# Patient Record
Sex: FEMALE | Race: WHITE | NOT HISPANIC OR LATINO | Employment: FULL TIME | URBAN - METROPOLITAN AREA
[De-identification: names, ages, dates, MRNs, and addresses within clinical notes are randomized per-mention and may not be internally consistent; named-entity substitution may affect disease eponyms.]

---

## 2017-01-26 ENCOUNTER — HISTORICAL (OUTPATIENT)
Dept: ADMINISTRATIVE | Facility: HOSPITAL | Age: 57
End: 2017-01-26

## 2017-01-26 LAB
ALBUMIN SERPL-MCNC: 4.3 G/DL (ref 3.1–4.7)
ALP SERPL-CCNC: 59 IU/L (ref 40–104)
ALT (SGPT): 38 IU/L (ref 3–33)
AST SERPL-CCNC: 38 IU/L (ref 10–40)
BASOPHILS NFR BLD: 0.1 K/UL (ref 0–0.2)
BASOPHILS NFR BLD: 0.9 %
BILIRUB SERPL-MCNC: 0.3 MG/DL (ref 0.3–1)
BUN SERPL-MCNC: 8 MG/DL (ref 8–20)
CALCIUM SERPL-MCNC: 9.2 MG/DL (ref 7.7–10.4)
CHLORIDE: 106 MMOL/L (ref 98–110)
CO2 SERPL-SCNC: 26.8 MMOL/L (ref 22.8–31.6)
CREATININE: 0.79 MG/DL (ref 0.6–1.4)
EOSINOPHIL NFR BLD: 0.2 K/UL (ref 0–0.7)
EOSINOPHIL NFR BLD: 2.3 %
ERYTHROCYTE [DISTWIDTH] IN BLOOD BY AUTOMATED COUNT: 12.1 % (ref 11.7–14.9)
GLUCOSE: 144 MG/DL (ref 70–99)
GRAN #: 3 K/UL (ref 1.4–6.5)
GRAN%: 46.1 %
HCT VFR BLD AUTO: 37.2 % (ref 36–48)
HGB BLD-MCNC: 12.2 G/DL (ref 12–15)
IMMATURE GRANS (ABS): 0 K/UL (ref 0–1)
IMMATURE GRANULOCYTES: 0.2 %
LYMPH #: 2.9 K/UL (ref 1.2–3.4)
LYMPH%: 45.1 %
MCH RBC QN AUTO: 31 PG (ref 25–35)
MCHC RBC AUTO-ENTMCNC: 32.8 G/DL (ref 31–36)
MCV RBC AUTO: 94.4 FL (ref 79–98)
MONO #: 0.4 K/UL (ref 0.1–0.6)
MONO%: 5.4 %
NUCLEATED RBCS: 0 %
PLATELET # BLD AUTO: 312 K/UL (ref 140–440)
PMV BLD AUTO: 9.7 FL (ref 8.8–12.7)
POTASSIUM SERPL-SCNC: 3.8 MMOL/L (ref 3.5–5)
PROT SERPL-MCNC: 7.2 G/DL (ref 6–8.2)
RBC # BLD AUTO: 3.94 M/UL (ref 3.5–5.5)
SODIUM: 140 MMOL/L (ref 134–144)
WBC # BLD AUTO: 6.5 K/UL (ref 5–10)

## 2017-06-05 RX ORDER — ALPRAZOLAM 1 MG/1
1 TABLET ORAL DAILY
COMMUNITY
End: 2019-06-25

## 2017-06-05 RX ORDER — MONTELUKAST SODIUM 10 MG/1
10 TABLET ORAL NIGHTLY
COMMUNITY

## 2017-06-05 RX ORDER — ALBUTEROL SULFATE 90 UG/1
2 AEROSOL, METERED RESPIRATORY (INHALATION) EVERY 6 HOURS PRN
COMMUNITY

## 2017-06-05 RX ORDER — TIZANIDINE HYDROCHLORIDE 4 MG/1
4 CAPSULE, GELATIN COATED ORAL 3 TIMES DAILY
COMMUNITY
End: 2017-06-05 | Stop reason: SDUPTHER

## 2017-06-05 RX ORDER — ESCITALOPRAM OXALATE 20 MG/1
20 TABLET ORAL DAILY
COMMUNITY
End: 2018-11-12

## 2017-06-05 RX ORDER — CLONAZEPAM 0.5 MG/1
0.5 TABLET ORAL 2 TIMES DAILY PRN
COMMUNITY
End: 2017-08-02 | Stop reason: SDUPTHER

## 2017-06-05 RX ORDER — QUETIAPINE FUMARATE 50 MG/1
50 TABLET, FILM COATED ORAL NIGHTLY
COMMUNITY
End: 2019-06-25

## 2017-06-05 RX ORDER — TOPIRAMATE 100 MG/1
100 TABLET, FILM COATED ORAL 2 TIMES DAILY
COMMUNITY
End: 2017-08-02

## 2017-06-05 RX ORDER — TRAMADOL HYDROCHLORIDE 50 MG/1
50 TABLET ORAL EVERY 6 HOURS PRN
COMMUNITY
End: 2017-08-02 | Stop reason: SDUPTHER

## 2017-06-05 RX ORDER — TIZANIDINE HYDROCHLORIDE 4 MG/1
4 CAPSULE, GELATIN COATED ORAL 3 TIMES DAILY
Qty: 60 CAPSULE | Refills: 5 | Status: SHIPPED | OUTPATIENT
Start: 2017-06-05 | End: 2017-08-02 | Stop reason: SDUPTHER

## 2017-08-02 ENCOUNTER — OFFICE VISIT (OUTPATIENT)
Dept: RHEUMATOLOGY | Facility: CLINIC | Age: 57
End: 2017-08-02
Payer: COMMERCIAL

## 2017-08-02 VITALS
BODY MASS INDEX: 33.13 KG/M2 | SYSTOLIC BLOOD PRESSURE: 160 MMHG | WEIGHT: 180 LBS | HEIGHT: 62 IN | DIASTOLIC BLOOD PRESSURE: 91 MMHG

## 2017-08-02 DIAGNOSIS — M15.9 OSTEOARTHRITIS, GENERALIZED: ICD-10-CM

## 2017-08-02 DIAGNOSIS — M79.7 FIBROMYALGIA: Primary | ICD-10-CM

## 2017-08-02 LAB
ALBUMIN SERPL-MCNC: 4.3 G/DL (ref 3.1–4.7)
ALP SERPL-CCNC: 69 IU/L (ref 40–104)
ALT (SGPT): 27 IU/L (ref 3–33)
AST SERPL-CCNC: 27 IU/L (ref 10–40)
BASOPHILS NFR BLD: 0.1 K/UL (ref 0–0.2)
BASOPHILS NFR BLD: 0.8 %
BILIRUB SERPL-MCNC: 0.6 MG/DL (ref 0.3–1)
BUN SERPL-MCNC: 12 MG/DL (ref 8–20)
CALCIUM SERPL-MCNC: 9.4 MG/DL (ref 7.7–10.4)
CHLORIDE: 102 MMOL/L (ref 98–110)
CO2 SERPL-SCNC: 26.1 MMOL/L (ref 22.8–31.6)
CREATININE: 0.67 MG/DL (ref 0.6–1.4)
CRP SERPL-MCNC: 0.4 MG/DL (ref 0–1.4)
EOSINOPHIL NFR BLD: 0.1 K/UL (ref 0–0.7)
EOSINOPHIL NFR BLD: 1.5 %
ERYTHROCYTE [DISTWIDTH] IN BLOOD BY AUTOMATED COUNT: 11.8 % (ref 11.7–14.9)
GLUCOSE: 98 MG/DL (ref 70–99)
GRAN #: 2.8 K/UL (ref 1.4–6.5)
GRAN%: 32.9 %
HCT VFR BLD AUTO: 37 % (ref 36–48)
HGB BLD-MCNC: 12.6 G/DL (ref 12–15)
IMMATURE GRANS (ABS): 0 K/UL (ref 0–1)
IMMATURE GRANULOCYTES: 0.1 %
LYMPH #: 5 K/UL (ref 1.2–3.4)
LYMPH%: 58.7 %
MCH RBC QN AUTO: 31 PG (ref 25–35)
MCHC RBC AUTO-ENTMCNC: 34.1 G/DL (ref 31–36)
MCV RBC AUTO: 90.9 FL (ref 79–98)
MONO #: 0.5 K/UL (ref 0.1–0.6)
MONO%: 6 %
NUCLEATED RBCS: 0 %
PLATELET # BLD AUTO: 393 K/UL (ref 140–440)
PMV BLD AUTO: 10.3 FL (ref 8.8–12.7)
POTASSIUM SERPL-SCNC: 3.5 MMOL/L (ref 3.5–5)
PROT SERPL-MCNC: 7.7 G/DL (ref 6–8.2)
RBC # BLD AUTO: 4.07 M/UL (ref 3.5–5.5)
SODIUM: 138 MMOL/L (ref 134–144)
TSH SERPL DL<=0.005 MIU/L-ACNC: 3.79 ULU/ML (ref 0.3–5.6)
URATE SERPL-MCNC: 5.6 MG/DL (ref 2.6–7.8)
VITAMIN D, 1,25 (OH)2: 46.9 NG/ML (ref 30–100)
WBC # BLD AUTO: 8.5 K/UL (ref 5–10)

## 2017-08-02 PROCEDURE — 99213 OFFICE O/P EST LOW 20 MIN: CPT | Mod: ,,, | Performed by: INTERNAL MEDICINE

## 2017-08-02 RX ORDER — TRAMADOL HYDROCHLORIDE 50 MG/1
50 TABLET ORAL EVERY 6 HOURS PRN
Qty: 60 TABLET | Refills: 3 | Status: SHIPPED | OUTPATIENT
Start: 2017-08-02 | End: 2017-11-28 | Stop reason: SDUPTHER

## 2017-08-02 RX ORDER — TIZANIDINE HYDROCHLORIDE 4 MG/1
4 CAPSULE, GELATIN COATED ORAL 3 TIMES DAILY
Qty: 60 CAPSULE | Refills: 5 | Status: SHIPPED | OUTPATIENT
Start: 2017-08-02 | End: 2017-11-28 | Stop reason: SDUPTHER

## 2017-08-02 RX ORDER — CLONAZEPAM 0.5 MG/1
0.5 TABLET ORAL NIGHTLY
Qty: 60 TABLET | Refills: 3 | Status: SHIPPED | OUTPATIENT
Start: 2017-08-02 | End: 2018-07-29 | Stop reason: SDUPTHER

## 2017-08-02 NOTE — PROGRESS NOTES
"       [facility] RHEUMATOLOGY            PROGRESS NOTE      Subjective:       Patient ID:   NAME: Isaura Troy : 1960     56 y.o. female    Referring Doc: No ref. provider found  Other Physicians:    Chief Complaint:  Fibromyalgia (follow up, increased pain since last visit) and Osteoarthritis (follow up)      History of Present Illness:     Patient returns today for a regularly scheduled follow-up visit for FM,OA      The patient is under the care of pain management for neck pain due to trauma.  Diffuse aches and pains and fatigue persist  Arthralgias but no swelling  AM stiffness for 10-15 mins          ROS:   GEN:  No  fever, night sweats . weight is stable   + fatigue  SKIN: no rashes, no bruising, no ulcerations, no Raynaud's  HEENT: +HA's, No visual changes, no mucosal ulcers, Dry mouth,  CV:   no CP, SOB, PND, BLOCK, no orthopnea, occ  palpitations  PULM: normal with no SOB, cough, hemoptysis, sputum or pleuritic pain  GI:  no abdominal pain,+ nausea,No vomiting, constipation, +diarrhea, No melanotic stools, BRBPR, hematemesis, no dysphagia  :   +dysuria  NEURO: L arm,L leg paresthesias,+ headaches, No visual disturbances, muscle weakness  MUSCULOSKELETAL:no joint swelling, prolonged AM stiffness, +back pain, + muscle pain  Allergies:  Review of patient's allergies indicates:   Allergen Reactions    Cefaclor Rash and Shortness Of Breath    Erythromycin Rash and Shortness Of Breath     "SLOWS MY BREATHING."    Sulfa (sulfonamide antibiotics) Other (See Comments) and Shortness Of Breath     "ASA EXACERBATES MY ASTHMA."    Aspirin        Medications:    Current Outpatient Prescriptions:     albuterol 90 mcg/actuation inhaler, Inhale 2 puffs into the lungs every 6 (six) hours as needed for Wheezing. Rescue, Disp: , Rfl:     alprazolam (XANAX) 1 MG tablet, Take 1 mg by mouth once daily. , Disp: , Rfl:     clonazePAM (KLONOPIN) 0.5 MG tablet, Take 0.5 mg by mouth 2 (two) times daily as needed for " "Anxiety., Disp: , Rfl:     escitalopram oxalate (LEXAPRO) 20 MG tablet, Take 20 mg by mouth once daily., Disp: , Rfl:     montelukast (SINGULAIR) 10 mg tablet, Take 10 mg by mouth every evening., Disp: , Rfl:     quetiapine (SEROQUEL) 50 MG tablet, Take 50 mg by mouth every evening., Disp: , Rfl:     tizanidine 4 mg Cap, Take 4 mg by mouth 3 (three) times daily. 1/4 - 1 tablet oral three times daily, Disp: 60 capsule, Rfl: 5    tramadol (ULTRAM) 50 mg tablet, Take 50 mg by mouth every 6 (six) hours as needed for Pain., Disp: , Rfl:     PMHx/PSHx Updates:        Objective:     Vitals:  Blood pressure (!) 160/91, height 5' 2" (1.575 m), weight 81.6 kg (180 lb).    Physical Examination:   GEN: no apparent distress, comfortable; AAOx3  SKIN: no rashes,no ulceration, no Raynaud's, no petechiae, no SQ nodules,  HEAD: normal  EYES: no pallor, no icterus,  NECK: no masses, thyroid normal, trachea midline, no LAD/LN's, supple  CV: RRR with no murmur; l S1 and S2 reg. ,no gallop no rubs,   CHEST: Normal respiratory effort; CTAB; normal breath sounds; no wheeze or crackles  ABDOM: nontender and nondistended; soft; no masses; no rebound/guarding  MUSC/Skeletal: ROM normal; no crepitus; joints without synovitis,  no deformities  No joint swelling or tenderness of PIP, MCP, wrist, elbow, shoulder, or knee joints.Widespread trigger points  EXTREM: no clubbing, cyanosis, no edemaNEURO: grossly intact; motor WNL; AAOx3; ,   PSYCH: normal mood, affect and behavior  LYMPH: normal cervical, supraclavicular          Labs:   Lab Results   Component Value Date    WBC 6.5 01/26/2017    HGB 12.2 01/26/2017    HCT 37.2 01/26/2017    MCV 94.4 01/26/2017     01/26/2017    CMP  @[facility](NA,K,CL,CO2,GLU,BUN,Creatinine,Calcium,PROT,Albumin,Bilitot,Alkphos,AST,ALT,CRP,ESR,RF,CCP,ALEXIS,SSA,CPK,uric acid) )@  I have reviewed all available lab results and radiology reports.    Radiology/Diagnostic Studies:        Assessment/Plan:   (6) 30 " y.o. female with diagnosis of FM,OA. More.symptomatic    Can increase Klonopin to 1 mg at bedtime. She does not take Xanax when she takes Klonopin. She takes Xanax  as needed only during the day.    Labs        Discussion:     I have explained all of the above in detail and the patient understands all of the current recommendation(s). I have answered all questions to the best of my ability and to their complete satisfaction.       The patient is to continue with the current management plan         RTC in  3-4 months      Electronically signed by Coral Hernández MD

## 2017-11-28 ENCOUNTER — OFFICE VISIT (OUTPATIENT)
Dept: RHEUMATOLOGY | Facility: CLINIC | Age: 57
End: 2017-11-28
Payer: COMMERCIAL

## 2017-11-28 VITALS
BODY MASS INDEX: 34.78 KG/M2 | DIASTOLIC BLOOD PRESSURE: 80 MMHG | HEIGHT: 62 IN | WEIGHT: 189 LBS | SYSTOLIC BLOOD PRESSURE: 148 MMHG

## 2017-11-28 DIAGNOSIS — M15.9 OSTEOARTHRITIS, GENERALIZED: ICD-10-CM

## 2017-11-28 DIAGNOSIS — M79.7 FIBROMYALGIA: Primary | ICD-10-CM

## 2017-11-28 PROCEDURE — 99213 OFFICE O/P EST LOW 20 MIN: CPT | Mod: ,,, | Performed by: INTERNAL MEDICINE

## 2017-11-28 RX ORDER — CLONAZEPAM 0.5 MG/1
0.5 TABLET ORAL 2 TIMES DAILY PRN
Qty: 60 TABLET | Refills: 3 | Status: SHIPPED | OUTPATIENT
Start: 2017-11-28 | End: 2018-03-28 | Stop reason: SDUPTHER

## 2017-11-28 RX ORDER — MECLIZINE HYDROCHLORIDE 25 MG/1
50 TABLET ORAL
COMMUNITY

## 2017-11-28 RX ORDER — TIZANIDINE HYDROCHLORIDE 4 MG/1
4 CAPSULE, GELATIN COATED ORAL 3 TIMES DAILY
Qty: 90 CAPSULE | Refills: 5 | Status: SHIPPED | OUTPATIENT
Start: 2017-11-28 | End: 2018-03-28 | Stop reason: DRUGHIGH

## 2017-11-28 RX ORDER — TRAMADOL HYDROCHLORIDE 50 MG/1
50 TABLET ORAL EVERY 6 HOURS PRN
Qty: 120 TABLET | Refills: 3 | Status: SHIPPED | OUTPATIENT
Start: 2017-11-28 | End: 2018-03-28 | Stop reason: SDUPTHER

## 2017-11-28 NOTE — PROGRESS NOTES
"       [facility] RHEUMATOLOGY            PROGRESS NOTE      Subjective:       Patient ID:   NAME: Isaura Troy : 1960     57 y.o. female    Referring Doc: No ref. provider found  Other Physicians:    Chief Complaint:  No chief complaint on file.  Fibromyalgia    History of Present Illness:     Patient returns today for a regularly scheduled follow-up visit for  Fibromyalgia     The patient has been doing since some increased musculoskeletal pain since weather changed. No fevers cough or shortness of breath.            ROS:   GEN:  No  fever, night sweats . weight is stable   + fatigue  SKIN: no rashes, no bruising, no ulcerations, no Raynaud's  HEENT: no HA's, No visual changes, no mucosal ulcers, no sicca symptoms,  CV:   no CP, SOB, PND, BLOCK, no orthopnea, no palpitations  PULM: normal with no SOB, cough, hemoptysis, sputum or pleuritic pain  GI:  no abdominal pain, nausea, vomiting, constipation, diarrhea, melanotic stools, BRBPR, hematemesis, no dysphagia  :   no dysuria  NEURO: no paresthesias, headaches, visual disturbances, muscle weakness  MUSCULOSKELETAL:no joint swelling, prolonged AM stiffness, no back pain, + muscle pain  Allergies:  Review of patient's allergies indicates:   Allergen Reactions    Cefaclor Rash and Shortness Of Breath    Erythromycin Rash and Shortness Of Breath     "SLOWS MY BREATHING."    Sulfa (sulfonamide antibiotics) Other (See Comments) and Shortness Of Breath     "ASA EXACERBATES MY ASTHMA."    Aspirin     Latex Other (See Comments)     Makes skin peel after prolonged exposure.        Medications:    Current Outpatient Prescriptions:     albuterol 90 mcg/actuation inhaler, Inhale 2 puffs into the lungs every 6 (six) hours as needed for Wheezing. Rescue, Disp: , Rfl:     alprazolam (XANAX) 1 MG tablet, Take 1 mg by mouth once daily. , Disp: , Rfl:     clonazePAM (KLONOPIN) 0.5 MG tablet, Take 1 tablet (0.5 mg total) by mouth every evening. (Patient taking " "differently: Take 0.5 mg by mouth every evening. Can take 1 or 2 tabs qhs prn), Disp: 60 tablet, Rfl: 3    escitalopram oxalate (LEXAPRO) 20 MG tablet, Take 20 mg by mouth once daily., Disp: , Rfl:     meclizine (ANTIVERT) 25 mg tablet, Take 50 mg by mouth., Disp: , Rfl:     montelukast (SINGULAIR) 10 mg tablet, Take 10 mg by mouth every evening., Disp: , Rfl:     quetiapine (SEROQUEL) 50 MG tablet, Take 50 mg by mouth every evening., Disp: , Rfl:     tiZANidine 4 mg Cap, Take 4 mg by mouth 3 (three) times daily. 1/4 - 1 tablet oral three times daily, Disp: 90 capsule, Rfl: 5    traMADol (ULTRAM) 50 mg tablet, Take 1 tablet (50 mg total) by mouth every 6 (six) hours as needed for Pain., Disp: 120 tablet, Rfl: 3    clonazePAM (KLONOPIN) 0.5 MG tablet, Take 1 tablet (0.5 mg total) by mouth 2 (two) times daily as needed for Anxiety., Disp: 60 tablet, Rfl: 3    PMHx/PSHx Updates:    Objective:     Vitals:  Blood pressure (!) 148/80, height 5' 2" (1.575 m), weight 85.7 kg (189 lb).    Physical Examination:   GEN: no apparent distress, comfortable; AAOx3  SKIN: + rash,erythematous  on r forehead,no ulceration, no Raynaud's, no petechiae, no SQ nodules,  HEAD: normal  EYES: no pallor, no icterus,  NECK: no masses, thyroid normal, trachea midline, no LAD/LN's, supple  CV: RRR with no murmur; l S1 and S2 reg. ,no gallop no rubs,   CHEST: Normal respiratory effort; CTAB; normal breath sounds; no wheeze or crackles  ABDOM: nontender and nondistended; soft; no masses; no rebound/guarding  MUSC/Skeletal: ROM normal; no crepitus; joints without synovitis,  no deformities  No joint swelling or tenderness of PIP, MCP, wrist, elbow, shoulder, or knee joints widespread trigger points  EXTREM: no clubbing, cyanosis, no edema,normal  pulses   NEURO: grossly intact; motor WNL; AAOx3; ,  PSYCH: normal mood, affect and behavior  LYMPH: normal cervical, supraclavicular          Labs:   Lab Results   Component Value Date    WBC 8.5 " 08/02/2017    HGB 12.6 08/02/2017    HCT 37.0 08/02/2017    MCV 90.9 08/02/2017     08/02/2017    CMP  @[facility](NA,K,CL,CO2,GLU,BUN,Creatinine,Calcium,PROT,Albumin,Bilitot,Alkphos,AST,ALT,CRP,ESR,RF,CCP,ALEXIS,SSA,CPK,uric acid) )@  I have reviewed all available lab results and radiology reports.    Radiology/Diagnostic Studies:        Assessment/Plan:   (1) 57 y.o. female with diagnosis of Fibromyalgia and osteoarthritis. Patient is stable.            Discussion:     I have explained all of the above in detail and the patient understands all of the current recommendation(s). I have answered all questions to the best of my ability and to their complete satisfaction.       The patient is to continue with the current management plan         RTC in   4 months or before when necessary      Electronically signed by Coral Hernández MD

## 2018-03-28 ENCOUNTER — OFFICE VISIT (OUTPATIENT)
Dept: RHEUMATOLOGY | Facility: CLINIC | Age: 58
End: 2018-03-28
Payer: COMMERCIAL

## 2018-03-28 VITALS
BODY MASS INDEX: 35.43 KG/M2 | SYSTOLIC BLOOD PRESSURE: 148 MMHG | DIASTOLIC BLOOD PRESSURE: 96 MMHG | WEIGHT: 193.69 LBS

## 2018-03-28 DIAGNOSIS — M89.9 BONE DISEASE: ICD-10-CM

## 2018-03-28 DIAGNOSIS — M79.7 FIBROMYALGIA: Primary | ICD-10-CM

## 2018-03-28 DIAGNOSIS — M15.9 OSTEOARTHRITIS, GENERALIZED: ICD-10-CM

## 2018-03-28 PROCEDURE — 99213 OFFICE O/P EST LOW 20 MIN: CPT | Mod: ,,, | Performed by: INTERNAL MEDICINE

## 2018-03-28 RX ORDER — TIZANIDINE 4 MG/1
TABLET ORAL
Qty: 90 TABLET | Refills: 5 | Status: SHIPPED | OUTPATIENT
Start: 2018-03-28 | End: 2018-07-05 | Stop reason: SDUPTHER

## 2018-03-28 RX ORDER — TRAMADOL HYDROCHLORIDE 50 MG/1
50 TABLET ORAL EVERY 6 HOURS PRN
Qty: 120 TABLET | Refills: 3 | Status: SHIPPED | OUTPATIENT
Start: 2018-03-28 | End: 2018-07-05 | Stop reason: SDUPTHER

## 2018-03-28 RX ORDER — DULOXETIN HYDROCHLORIDE 30 MG/1
CAPSULE, DELAYED RELEASE ORAL
Qty: 60 CAPSULE | Refills: 5 | Status: SHIPPED | OUTPATIENT
Start: 2018-03-28 | End: 2018-11-12 | Stop reason: SDUPTHER

## 2018-03-28 RX ORDER — CLONAZEPAM 0.5 MG/1
0.5 TABLET ORAL 2 TIMES DAILY PRN
Qty: 60 TABLET | Refills: 3 | Status: SHIPPED | OUTPATIENT
Start: 2018-03-28 | End: 2018-07-05 | Stop reason: SDUPTHER

## 2018-03-28 RX ORDER — TIZANIDINE 4 MG/1
TABLET ORAL
Refills: 5 | COMMUNITY
Start: 2018-02-27 | End: 2018-03-28 | Stop reason: SDUPTHER

## 2018-03-28 RX ORDER — CLONAZEPAM 0.5 MG/1
0.5 TABLET ORAL NIGHTLY
Qty: 60 TABLET | Refills: 3 | Status: SHIPPED | OUTPATIENT
Start: 2018-03-28 | End: 2018-04-26 | Stop reason: SDUPTHER

## 2018-03-28 RX ORDER — DULOXETIN HYDROCHLORIDE 30 MG/1
CAPSULE, DELAYED RELEASE ORAL
Refills: 5 | COMMUNITY
Start: 2018-02-08 | End: 2018-03-28 | Stop reason: SDUPTHER

## 2018-03-28 NOTE — PROGRESS NOTES
"       [facility] RHEUMATOLOGY            PROGRESS NOTE      Subjective:       Patient ID:   NAME: Isaura Troy : 1960     57 y.o. female    Referring Doc: No ref. provider found  Other Physicians:    Chief Complaint:  Fibromyalgia      History of Present Illness:     Patient returns today for a regularly scheduled follow-up visit for Fibromyalgia and osteoarthritis.      The patient is doing well. She has periods of more fatigue and pain  At this time she is doing well. No fevers cough shortness of breath. No joint swelling.            ROS:   GEN:  No  fever, night sweats . weight is stable   some fatigue  SKIN: no rashes, no bruising, no ulcerations, no Raynaud's  HEENT: no HA's, No visual changes, no mucosal ulcers, no sicca symptoms,  CV:   no CP, SOB, PND, BLOCK, no orthopnea, no palpitations  PULM: normal with no SOB, cough, hemoptysis, sputum or pleuritic pain  GI:  no abdominal pain, nausea, vomiting, constipation, diarrhea, melanotic stools, BRBPR, hematemesis, no dysphagia  :   no dysuria  NEURO: no paresthesias, headaches, visual disturbances, muscle weakness  MUSCULOSKELETAL:no joint swelling, prolonged AM stiffness, + back pain, + muscle pain  Allergies:  Review of patient's allergies indicates:   Allergen Reactions    Cefaclor Rash and Shortness Of Breath    Erythromycin Rash and Shortness Of Breath     "SLOWS MY BREATHING."    Sulfa (sulfonamide antibiotics) Other (See Comments) and Shortness Of Breath     "ASA EXACERBATES MY ASTHMA."    Aspirin     Latex Other (See Comments)     Makes skin peel after prolonged exposure.        Medications:    Current Outpatient Prescriptions:     albuterol 90 mcg/actuation inhaler, Inhale 2 puffs into the lungs every 6 (six) hours as needed for Wheezing. Rescue, Disp: , Rfl:     alprazolam (XANAX) 1 MG tablet, Take 1 mg by mouth once daily. , Disp: , Rfl:     clonazePAM (KLONOPIN) 0.5 MG tablet, Take 1 tablet (0.5 mg total) by mouth every evening. " (Patient taking differently: Take 0.5 mg by mouth every evening. Can take 1 or 2 tabs qhs prn), Disp: 60 tablet, Rfl: 3    clonazePAM (KLONOPIN) 0.5 MG tablet, Take 1 tablet (0.5 mg total) by mouth 2 (two) times daily as needed for Anxiety., Disp: 60 tablet, Rfl: 3    DULoxetine (CYMBALTA) 30 MG capsule, TK 1 C PO BID, Disp: , Rfl: 5    escitalopram oxalate (LEXAPRO) 20 MG tablet, Take 20 mg by mouth once daily., Disp: , Rfl:     meclizine (ANTIVERT) 25 mg tablet, Take 50 mg by mouth., Disp: , Rfl:     montelukast (SINGULAIR) 10 mg tablet, Take 10 mg by mouth every evening., Disp: , Rfl:     quetiapine (SEROQUEL) 50 MG tablet, Take 50 mg by mouth every evening., Disp: , Rfl:     tiZANidine (ZANAFLEX) 4 MG tablet, TK 1/4 TO 1 T PO TID, Disp: , Rfl: 5    traMADol (ULTRAM) 50 mg tablet, Take 1 tablet (50 mg total) by mouth every 6 (six) hours as needed for Pain., Disp: 120 tablet, Rfl: 3    PMHx/PSHx Updates:        Objective:     Vitals:  Blood pressure (!) 148/96, weight 87.9 kg (193 lb 11.2 oz).    Physical Examination:   GEN: no apparent distress, comfortable; AAOx3  SKIN: no rashes,no ulceration, no Raynaud's, no petechiae, no SQ nodules,  HEAD: normal  EYES: no pallor, no icterus,   NECK: no masses, thyroid normal, trachea midline, no LAD/LN's, supple  CV: RRR with no murmur; l S1 and S2 reg. ,no gallop no rubs,   CHEST: Normal respiratory effort; CTAB; normal breath sounds; no wheeze or crackles  MUSC/Skeletal: ROM normal; no crepitus; joints without synovitis,  no deformities  No joint swelling or tenderness of PIP, MCP, wrist, elbow, shoulder, or knee joints  EXTREM: no clubbing, cyanosis, no edema,normal  pulses   NEURO: grossly intact; motor WNL; AAOx3;   PSYCH: normal mood, affect and behavior  LYMPH: normal cervical, supraclavicular          Labs:   Lab Results   Component Value Date    WBC 8.5 08/02/2017    HGB 12.6 08/02/2017    HCT 37.0 08/02/2017    MCV 90.9 08/02/2017     08/02/2017     CMP  @[facility](NA,K,CL,CO2,GLU,BUN,Creatinine,Calcium,PROT,Albumin,Bilitot,Alkphos,AST,ALT,CRP,ESR,RF,CCP,ALEXIS,SSA,CPK,uric acid) )@  I have reviewed all available lab results and radiology reports.    Radiology/Diagnostic Studies:        Assessment/Plan:   (1) 57 y.o. female with diagnosis of Fibromyalgia and osteoarthritis. She is doing well    She will send copies of recent blood tests done by her primary care physician.        Discussion:     I have explained all of the above in detail and the patient understands all of the current recommendation(s). I have answered all questions to the best of my ability and to their complete satisfaction.       The patient is to continue with the current management plan         RTC in   4 months or before if needed      Electronically signed by Coral Hernández MD

## 2018-04-26 RX ORDER — CLONAZEPAM 0.5 MG/1
TABLET ORAL
Qty: 60 TABLET | Refills: 2 | Status: SHIPPED | OUTPATIENT
Start: 2018-04-26 | End: 2018-05-26

## 2018-07-05 ENCOUNTER — OFFICE VISIT (OUTPATIENT)
Dept: RHEUMATOLOGY | Facility: CLINIC | Age: 58
End: 2018-07-05
Payer: COMMERCIAL

## 2018-07-05 VITALS
WEIGHT: 186.19 LBS | BODY MASS INDEX: 34.06 KG/M2 | DIASTOLIC BLOOD PRESSURE: 84 MMHG | SYSTOLIC BLOOD PRESSURE: 130 MMHG

## 2018-07-05 DIAGNOSIS — M79.7 FIBROMYALGIA: Primary | ICD-10-CM

## 2018-07-05 DIAGNOSIS — M15.9 OSTEOARTHRITIS, GENERALIZED: ICD-10-CM

## 2018-07-05 PROCEDURE — 99213 OFFICE O/P EST LOW 20 MIN: CPT | Mod: ,,, | Performed by: INTERNAL MEDICINE

## 2018-07-05 PROCEDURE — 3008F BODY MASS INDEX DOCD: CPT | Mod: ,,, | Performed by: INTERNAL MEDICINE

## 2018-07-05 RX ORDER — CLONAZEPAM 0.5 MG/1
0.5 TABLET ORAL 2 TIMES DAILY PRN
Qty: 60 TABLET | Refills: 3 | Status: SHIPPED | OUTPATIENT
Start: 2018-07-05 | End: 2018-11-12 | Stop reason: SDUPTHER

## 2018-07-05 RX ORDER — TIZANIDINE 4 MG/1
TABLET ORAL
Qty: 90 TABLET | Refills: 5 | Status: SHIPPED | OUTPATIENT
Start: 2018-07-05 | End: 2018-11-12 | Stop reason: SDUPTHER

## 2018-07-05 RX ORDER — TRAMADOL HYDROCHLORIDE 50 MG/1
50 TABLET ORAL EVERY 6 HOURS PRN
Qty: 120 TABLET | Refills: 3 | Status: SHIPPED | OUTPATIENT
Start: 2018-07-05 | End: 2018-08-30 | Stop reason: SDUPTHER

## 2018-07-05 NOTE — PROGRESS NOTES
"       [facility] RHEUMATOLOGY            PROGRESS NOTE      Subjective:       Patient ID:   NAME: Isaura Troy : 1960     57 y.o. female    Referring Doc: No ref. provider found  Other Physicians:    Chief Complaint:  Fibromyalgia      History of Present Illness:     Patient returns today for a regularly scheduled follow-up visit for  FM and Osteoarthritis     The patient is doing okay. No major flareups since her last visit. Some episodes of arthralgias and myalgias and fatigue. No chest pains, no fevers. No shortness of breath.            ROS:   GEN:  No  fever, night sweats . weight is stable   No fatigue  SKIN: no rashes, no bruising, no ulcerations, no Raynaud's  HEENT: no HA's, No visual changes, no mucosal ulcers, no sicca symptoms,  CV:   no CP, SOB, PND, BLOCK, no orthopnea, no palpitations  PULM: normal with no SOB, cough, hemoptysis, sputum or pleuritic pain  GI:  no abdominal pain, nausea, vomiting, constipation, diarrhea, melanotic stools, BRBPR, hematemesis, no dysphagia  :   no dysuria  NEURO: no paresthesias, headaches, visual disturbances, muscle weakness  MUSCULOSKELETAL:no joint swelling, prolonged AM stiffness, + back pain, + muscle pain  Allergies:  Review of patient's allergies indicates:   Allergen Reactions    Cefaclor Rash and Shortness Of Breath    Erythromycin Rash and Shortness Of Breath     "SLOWS MY BREATHING."    Sulfa (sulfonamide antibiotics) Other (See Comments) and Shortness Of Breath     "ASA EXACERBATES MY ASTHMA."    Aspirin     Latex Other (See Comments)     Makes skin peel after prolonged exposure.        Medications:    Current Outpatient Prescriptions:     alprazolam (XANAX) 1 MG tablet, Take 1 mg by mouth once daily. , Disp: , Rfl:     clonazePAM (KLONOPIN) 0.5 MG tablet, Take 1 tablet (0.5 mg total) by mouth 2 (two) times daily as needed for Anxiety., Disp: 60 tablet, Rfl: 3    diclofenac sodium 2 % SoPk, Apply 40 mg topically 2 (two) times daily., Disp: " 1 packet, Rfl: 2    DULoxetine (CYMBALTA) 30 MG capsule, TK 1 C PO BID, Disp: 60 capsule, Rfl: 5    escitalopram oxalate (LEXAPRO) 20 MG tablet, Take 20 mg by mouth once daily., Disp: , Rfl:     meclizine (ANTIVERT) 25 mg tablet, Take 50 mg by mouth., Disp: , Rfl:     montelukast (SINGULAIR) 10 mg tablet, Take 10 mg by mouth every evening., Disp: , Rfl:     quetiapine (SEROQUEL) 50 MG tablet, Take 50 mg by mouth every evening., Disp: , Rfl:     tiZANidine (ZANAFLEX) 4 MG tablet, TK 1/4 TO 1 T PO TID, Disp: 90 tablet, Rfl: 5    traMADol (ULTRAM) 50 mg tablet, Take 1 tablet (50 mg total) by mouth every 6 (six) hours as needed for Pain., Disp: 120 tablet, Rfl: 3    albuterol 90 mcg/actuation inhaler, Inhale 2 puffs into the lungs every 6 (six) hours as needed for Wheezing. Rescue, Disp: , Rfl:     PMHx/PSHx Updates:          Objective:     Vitals:  Blood pressure 130/84, weight 84.5 kg (186 lb 3.2 oz).    Physical Examination:   GEN: no apparent distress, comfortable; AAOx3  SKIN: no rashes,no ulceration, no Raynaud's, no petechiae, no SQ nodules,  HEAD: normal  EYES: no pallor, no icterus,   NECK: no masses, thyroid normal, trachea midline, no LAD/LN's, supple  CV: RRR with no murmur; l S1 and S2 reg. ,no gallop no rubs,   CHEST: Normal respiratory effort; CTAB; normal breath sounds; no wheeze or crackles  MUSC/Skeletal: ROM normal; no crepitus; joints without synovitis,  no deformities  No joint swelling or tenderness of PIP, MCP, wrist, elbow, shoulder, or knee joints  EXTREM: no clubbing, cyanosis, no edema,normal  pulses   NEURO: grossly intact; motor WNL; AAOx3; ,  PSYCH: normal mood, affect and behavior  LYMPH: normal cervical, supraclavicular          Labs:   Lab Results   Component Value Date    WBC 8.5 08/02/2017    HGB 12.6 08/02/2017    HCT 37.0 08/02/2017    MCV 90.9 08/02/2017     08/02/2017     CMP  @[facility](NA,K,CL,CO2,GLU,BUN,Creatinine,Calcium,PROT,Albumin,Bilitot,Alkphos,AST,ALT,CRP,ESR,RF,CCP,ALEXIS,SSA,CPK,uric acid) )@  I have reviewed all available lab results and radiology reports.    Radiology/Diagnostic Studies:        Assessment/Plan:   (1) 57 y.o. female with diagnosis of Fibromyalgia and Osteoarthritis.She is stable        CBC CMP    Discussion:     I have explained all of the above in detail and the patient understands all of the current recommendation(s). I have answered all questions to the best of my ability and to their complete satisfaction.       The patient is to continue with the current management plan         RTC in   1 month or before if needed      Electronically signed by Coral Hernández MD

## 2018-07-29 RX ORDER — CLONAZEPAM 0.5 MG/1
TABLET ORAL
Qty: 60 TABLET | Refills: 0 | Status: SHIPPED | OUTPATIENT
Start: 2018-07-29 | End: 2018-11-12

## 2018-08-28 RX ORDER — TIZANIDINE 4 MG/1
TABLET ORAL
Qty: 60 TABLET | Refills: 0 | Status: SHIPPED | OUTPATIENT
Start: 2018-08-28 | End: 2018-11-12

## 2018-08-30 RX ORDER — TIZANIDINE HYDROCHLORIDE 4 MG/1
CAPSULE, GELATIN COATED ORAL
Qty: 90 CAPSULE | Refills: 0 | Status: SHIPPED | OUTPATIENT
Start: 2018-08-30 | End: 2018-11-12

## 2018-08-30 RX ORDER — TRAMADOL HYDROCHLORIDE 50 MG/1
TABLET ORAL
Qty: 120 TABLET | Refills: 0 | Status: SHIPPED | OUTPATIENT
Start: 2018-08-30 | End: 2018-11-12 | Stop reason: SDUPTHER

## 2018-11-12 ENCOUNTER — OFFICE VISIT (OUTPATIENT)
Dept: RHEUMATOLOGY | Facility: CLINIC | Age: 58
End: 2018-11-12
Payer: COMMERCIAL

## 2018-11-12 VITALS
BODY MASS INDEX: 35.03 KG/M2 | HEART RATE: 90 BPM | SYSTOLIC BLOOD PRESSURE: 133 MMHG | WEIGHT: 191.5 LBS | DIASTOLIC BLOOD PRESSURE: 74 MMHG

## 2018-11-12 DIAGNOSIS — M79.7 FIBROMYALGIA: Primary | ICD-10-CM

## 2018-11-12 DIAGNOSIS — M15.9 OSTEOARTHRITIS, GENERALIZED: ICD-10-CM

## 2018-11-12 DIAGNOSIS — M85.88 OSTEOPENIA OF LUMBAR SPINE: ICD-10-CM

## 2018-11-12 PROCEDURE — 3008F BODY MASS INDEX DOCD: CPT | Mod: ,,, | Performed by: INTERNAL MEDICINE

## 2018-11-12 PROCEDURE — 99213 OFFICE O/P EST LOW 20 MIN: CPT | Mod: ,,, | Performed by: INTERNAL MEDICINE

## 2018-11-12 RX ORDER — CLONAZEPAM 0.5 MG/1
0.5 TABLET ORAL 2 TIMES DAILY PRN
Qty: 60 TABLET | Refills: 3 | Status: SHIPPED | OUTPATIENT
Start: 2018-11-12 | End: 2019-04-11 | Stop reason: SDUPTHER

## 2018-11-12 RX ORDER — TIZANIDINE 4 MG/1
TABLET ORAL
Qty: 90 TABLET | Refills: 5 | Status: SHIPPED | OUTPATIENT
Start: 2018-11-12 | End: 2019-06-25

## 2018-11-12 RX ORDER — TRAMADOL HYDROCHLORIDE 50 MG/1
50 TABLET ORAL EVERY 6 HOURS PRN
Qty: 120 TABLET | Refills: 2 | Status: SHIPPED | OUTPATIENT
Start: 2018-11-12 | End: 2019-04-11 | Stop reason: SDUPTHER

## 2018-11-12 RX ORDER — DULOXETIN HYDROCHLORIDE 30 MG/1
CAPSULE, DELAYED RELEASE ORAL
Qty: 60 CAPSULE | Refills: 5 | Status: SHIPPED | OUTPATIENT
Start: 2018-11-12 | End: 2019-06-25

## 2018-11-12 NOTE — PROGRESS NOTES
"       [facility] RHEUMATOLOGY            PROGRESS NOTE      Subjective:       Patient ID:   NAME: Isaura Troy : 1960     58 y.o. female    Referring Doc: No ref. provider found  Other Physicians:    Chief Complaint:  Fibromyalgia (Left ankle sprain possible broken toe)      History of Present Illness:     Patient returns today for a regularly scheduled follow-up visit for  OA and Fibromyalgia     The patient about a week or 2 ago and sprained her left ankle. She has it wrapped with eyes with improvement. No chest pains cough shortness of breath. Overall fibromyalgia has done well. + occasional bouts of fatigue.          ROS:   GEN:  No  fever, night sweats . weight is stable  + mild  fatigue  SKIN: no rashes, no bruising, no ulcerations, no Raynaud's  HEENT: no HA's, No visual changes, no mucosal ulcers, no sicca symptoms,  CV:   no CP, SOB, PND, BLOCK, no orthopnea, no palpitations  PULM: normal with no SOB, cough, hemoptysis, sputum or pleuritic pain  GI:  no abdominal pain, nausea, vomiting, constipation, diarrhea, melanotic stools, BRBPR, hematemesis, no dysphagia  :   no dysuria  NEURO: no paresthesias, headaches, visual disturbances, muscle weakness  MUSCULOSKELETAL:no joint swelling, prolonged AM stiffness, no back pain, + muscle pain  Allergies:  Review of patient's allergies indicates:   Allergen Reactions    Cefaclor Rash and Shortness Of Breath    Erythromycin Rash and Shortness Of Breath     "SLOWS MY BREATHING."    Sulfa (sulfonamide antibiotics) Other (See Comments) and Shortness Of Breath     "ASA EXACERBATES MY ASTHMA."    Aspirin     Latex Other (See Comments)     Makes skin peel after prolonged exposure.        Medications:    Current Outpatient Medications:     albuterol 90 mcg/actuation inhaler, Inhale 2 puffs into the lungs every 6 (six) hours as needed for Wheezing. Rescue, Disp: , Rfl:     alprazolam (XANAX) 1 MG tablet, Take 1 mg by mouth once daily. , Disp: , Rfl:     " clonazePAM (KLONOPIN) 0.5 MG tablet, Take 1 tablet (0.5 mg total) by mouth 2 (two) times daily as needed for Anxiety., Disp: 60 tablet, Rfl: 3    diclofenac sodium 2 % SoPk, Apply 40 mg topically 2 (two) times daily., Disp: 1 packet, Rfl: 2    DULoxetine (CYMBALTA) 30 MG capsule, TK 1 C PO BID, Disp: 60 capsule, Rfl: 5    meclizine (ANTIVERT) 25 mg tablet, Take 50 mg by mouth., Disp: , Rfl:     montelukast (SINGULAIR) 10 mg tablet, Take 10 mg by mouth every evening., Disp: , Rfl:     quetiapine (SEROQUEL) 50 MG tablet, Take 50 mg by mouth every evening., Disp: , Rfl:     tiZANidine (ZANAFLEX) 4 MG tablet, TK 1/4 TO 1 T PO TID, Disp: 90 tablet, Rfl: 5    traMADol (ULTRAM) 50 mg tablet, Take 1 tablet (50 mg total) by mouth every 6 (six) hours as needed., Disp: 120 tablet, Rfl: 2    PMHx/PSHx Updates:        Objective:     Vitals:  Blood pressure 133/74, pulse 90, weight 86.9 kg (191 lb 8 oz).    Physical Examination:   GEN: no apparent distress, comfortable; AAOx3  SKIN: no rashes,no ulceration, no Raynaud's, no petechiae, no SQ nodules,  HEAD: normal  EYES: no pallor, no icterus,  NECK: no masses, thyroid normal, trachea midline, no LAD/LN's, supple  CV: RRR with no murmur; l S1 and S2 reg. ,no gallop no rubs,   CHEST: Normal respiratory effort; CTAB; normal breath sounds; no wheeze or crackles  MUSC/Skeletal: ROM normal; no crepitus; joints without synovitis,  no deformities  No joint swelling or tenderness of PIP, MCP, wrist, elbow, shoulder, or knee joints  EXTREM: no clubbing, cyanosis, no edema,normal  pulses   NEURO: grossly intact; motor WNL; AAOx3; ,  PSYCH: normal mood, affect and behavior  LYMPH: normal cervical, supraclavicular          Labs:   Lab Results   Component Value Date    WBC 8.5 08/02/2017    HGB 12.6 08/02/2017    HCT 37.0 08/02/2017    MCV 90.9 08/02/2017     08/02/2017     CMP  @[facility](NA,K,CL,CO2,GLU,BUN,Creatinine,Calcium,PROT,Albumin,Bilitot,Alkphos,AST,ALT,CRP,ESR,RF,CCP,ALEXIS,SSA,CPK,uric acid) )@  I have reviewed all available lab results and radiology reports.    Radiology/Diagnostic Studies:        Assessment/Plan:   (1) 58 y.o. female with diagnosis of fibromyalgia and osteoarthritis. Patient is doing well.  2) L ankle sprain.Declines X Ray      CBC,CMP        Discussion:     I have explained all of the above in detail and the patient understands all of the current recommendation(s). I have answered all questions to the best of my ability and to their complete satisfaction.       The patient is to continue with the current management plan         RTC in 4 months or before if needed        Electronically signed by Coral Hernández MD

## 2019-04-15 RX ORDER — CLONAZEPAM 0.5 MG/1
TABLET ORAL
Qty: 60 TABLET | Refills: 0 | Status: SHIPPED | OUTPATIENT
Start: 2019-04-15 | End: 2019-05-22 | Stop reason: SDUPTHER

## 2019-04-15 RX ORDER — TRAMADOL HYDROCHLORIDE 50 MG/1
TABLET ORAL
Qty: 120 TABLET | Refills: 0 | Status: SHIPPED | OUTPATIENT
Start: 2019-04-15 | End: 2019-05-30 | Stop reason: SDUPTHER

## 2019-05-29 RX ORDER — CLONAZEPAM 0.5 MG/1
TABLET ORAL
Qty: 60 TABLET | Refills: 0 | Status: SHIPPED | OUTPATIENT
Start: 2019-05-29 | End: 2019-06-25

## 2019-05-30 RX ORDER — TRAMADOL HYDROCHLORIDE 50 MG/1
TABLET ORAL
Qty: 120 TABLET | Refills: 0 | Status: SHIPPED | OUTPATIENT
Start: 2019-05-30 | End: 2019-06-25

## 2019-06-25 ENCOUNTER — OFFICE VISIT (OUTPATIENT)
Dept: RHEUMATOLOGY | Facility: CLINIC | Age: 59
End: 2019-06-25
Payer: COMMERCIAL

## 2019-06-25 VITALS — DIASTOLIC BLOOD PRESSURE: 90 MMHG | BODY MASS INDEX: 31.92 KG/M2 | SYSTOLIC BLOOD PRESSURE: 150 MMHG | WEIGHT: 174.5 LBS

## 2019-06-25 DIAGNOSIS — M79.7 FIBROMYALGIA: Primary | ICD-10-CM

## 2019-06-25 DIAGNOSIS — M15.9 OSTEOARTHRITIS, GENERALIZED: ICD-10-CM

## 2019-06-25 PROCEDURE — 3008F PR BODY MASS INDEX (BMI) DOCUMENTED: ICD-10-PCS | Mod: ,,, | Performed by: INTERNAL MEDICINE

## 2019-06-25 PROCEDURE — 99213 PR OFFICE/OUTPT VISIT, EST, LEVL III, 20-29 MIN: ICD-10-PCS | Mod: ,,, | Performed by: INTERNAL MEDICINE

## 2019-06-25 PROCEDURE — 99213 OFFICE O/P EST LOW 20 MIN: CPT | Mod: ,,, | Performed by: INTERNAL MEDICINE

## 2019-06-25 PROCEDURE — 3008F BODY MASS INDEX DOCD: CPT | Mod: ,,, | Performed by: INTERNAL MEDICINE

## 2019-06-25 RX ORDER — PROMETHAZINE HYDROCHLORIDE 25 MG/1
25 TABLET ORAL
COMMUNITY

## 2019-06-25 RX ORDER — TRAMADOL HYDROCHLORIDE 50 MG/1
TABLET ORAL
Qty: 180 TABLET | Refills: 3 | Status: SHIPPED | OUTPATIENT
Start: 2019-06-25

## 2019-06-25 RX ORDER — DIVALPROEX SODIUM 250 MG/1
250 TABLET, DELAYED RELEASE ORAL 3 TIMES DAILY
COMMUNITY
End: 2019-12-23

## 2019-06-25 RX ORDER — DULOXETIN HYDROCHLORIDE 30 MG/1
CAPSULE, DELAYED RELEASE ORAL
Qty: 60 CAPSULE | Refills: 5 | Status: SHIPPED | OUTPATIENT
Start: 2019-06-25

## 2019-06-25 RX ORDER — TIZANIDINE 4 MG/1
TABLET ORAL
Qty: 90 TABLET | Refills: 3 | Status: SHIPPED | OUTPATIENT
Start: 2019-06-25 | End: 2019-12-23 | Stop reason: SDUPTHER

## 2019-06-25 RX ORDER — AMITRIPTYLINE HYDROCHLORIDE 10 MG/1
10 TABLET, FILM COATED ORAL NIGHTLY PRN
COMMUNITY
End: 2019-12-23 | Stop reason: DRUGHIGH

## 2019-06-25 RX ORDER — CLONAZEPAM 0.5 MG/1
TABLET ORAL
Qty: 60 TABLET | Refills: 3 | Status: SHIPPED | OUTPATIENT
Start: 2019-06-25 | End: 2019-11-02 | Stop reason: SDUPTHER

## 2019-06-25 NOTE — PROGRESS NOTES
"       [facility] RHEUMATOLOGY            PROGRESS NOTE      Subjective:       Patient ID:   NAME: Isaura Troy : 1960     58 y.o. female    Referring Doc: No ref. provider found  Other Physicians:    Chief Complaint:  Fibromyalgia      History of Present Illness:     Patient returns today for a regularly scheduled follow-up visit for FM and OA    The patient is doing well except for chronic headaches. She is seeing her neurologist and primary care physician for blood pressure control and headaches. Some fatigue. No chest pains, cough or shortness of breath. No joint swelling. Occasional muscle aches controlled with medications            ROS:   GEN:  No  fever, night sweats . weight is stable   +_ fatigue  SKIN: no rashes, no bruising, no ulcerations, no Raynaud's  HEENT: no HA's, No visual changes, no mucosal ulcers, no sicca symptoms,  CV:   no CP, SOB, PND, BLOCK, no orthopnea, no palpitations  PULM: normal with no SOB, cough, hemoptysis, sputum or pleuritic pain  GI:  no abdominal pain, nausea, vomiting, constipation, diarrhea, melanotic stools, BRBPR, hematemesis, no dysphagia  :   no dysuria  NEURO: no paresthesias, headaches, visual disturbances, muscle weakness  MUSCULOSKELETAL:no joint swelling, prolonged AM stiffness, no back pain, + muscle pain  Allergies:  Review of patient's allergies indicates:   Allergen Reactions    Cefaclor Rash and Shortness Of Breath    Erythromycin Rash and Shortness Of Breath     "SLOWS MY BREATHING."    Sulfa (sulfonamide antibiotics) Other (See Comments) and Shortness Of Breath     "ASA EXACERBATES MY ASTHMA."    Aspirin     Latex Other (See Comments)     Makes skin peel after prolonged exposure.        Medications:    Current Outpatient Medications:     albuterol 90 mcg/actuation inhaler, Inhale 2 puffs into the lungs every 6 (six) hours as needed for Wheezing. Rescue, Disp: , Rfl:     amitriptyline (ELAVIL) 10 MG tablet, Take 10 mg by mouth nightly as needed " for Insomnia. , Disp: , Rfl:     clonazePAM (KLONOPIN) 0.5 MG tablet, 2 po hs, Disp: 60 tablet, Rfl: 3    diclofenac sodium 2 % SoPk, Apply 40 mg topically 2 (two) times daily., Disp: 1 packet, Rfl: 2    divalproex (DEPAKOTE) 250 MG EC tablet, Take 250 mg by mouth 3 (three) times daily., Disp: , Rfl:     DULoxetine (CYMBALTA) 30 MG capsule, TK 1 C PO BID, Disp: 60 capsule, Rfl: 5    meclizine (ANTIVERT) 25 mg tablet, Take 50 mg by mouth., Disp: , Rfl:     montelukast (SINGULAIR) 10 mg tablet, Take 10 mg by mouth every evening., Disp: , Rfl:     promethazine (PHENERGAN) 25 MG tablet, Take 25 mg by mouth every 4 to 6 hours as needed for Nausea., Disp: , Rfl:     tiZANidine (ZANAFLEX) 4 MG tablet, TK 1/4 TO 1 T PO TID, Disp: 90 tablet, Rfl: 3    traMADol (ULTRAM) 50 mg tablet, 1-2 po q hs prn, Disp: 180 tablet, Rfl: 3    PMHx/PSHx Updates:        Objective:     Vitals:  Blood pressure (!) 150/90, weight 79.2 kg (174 lb 8 oz).    Physical Examination:   GEN: no apparent distress, comfortable; AAOx3  SKIN: no rashes,no ulceration, no Raynaud's, no petechiae, no SQ nodules,  HEAD: normal  EYES: no pallor, no icterus  NECK: no masses, thyroid normal, trachea midline, no LAD/LN's, supple  CV: RRR with no murmur; l S1 and S2 reg. ,no gallop no rubs,   CHEST: Normal respiratory effort; CTAB; normal breath sounds; no wheeze or crackles  MUSC/Skeletal: ROM normal; no crepitus; joints without synovitis,  no deformities  No joint swelling or tenderness of PIP, MCP, wrist, elbow, shoulder, or knee joints  EXTREM: no clubbing, cyanosis, no edema,normal  pulses   NEURO: grossly intact; motor WNL; AAOx3; ,   PSYCH: normal mood, affect and behavior  LYMPH: normal cervical, supraclavicular          Labs:   Lab Results   Component Value Date    WBC 8.5 08/02/2017    HGB 12.6 08/02/2017    HCT 37.0 08/02/2017    MCV 90.9 08/02/2017     08/02/2017     CMP  @LASTLAB(NA,K,CL,CO2,GLU,BUN,Creatinine,Calcium,PROT,Albumin,Bilitot,Alkphos,AST,ALT,CRP,ESR,RF,CCP,ALEXIS,SSA,CPK,uric acid) )@  I have reviewed all available lab results and radiology reports.    Radiology/Diagnostic Studies:        Assessment/Plan:   (1) 58 y.o. female with diagnosis of Fibromyalgia and Osteoarthritis she is stable  2)elevated blood pressure . she is monitoring  and at home and seeing primary  care physician for adjustment of antihypertensive medications              Discussion:     I have explained all of the above in detail and the patient understands all of the current recommendation(s). I have answered all questions to the best of my ability and to their complete satisfaction.       The patient is to continue with the current management plan         RTC in   4 months      Electronically signed by Coral Hernández MD

## 2019-06-26 RX ORDER — TRAMADOL HYDROCHLORIDE 50 MG/1
TABLET ORAL
Qty: 120 TABLET | Refills: 0 | OUTPATIENT
Start: 2019-06-26

## 2019-11-04 RX ORDER — CLONAZEPAM 0.5 MG/1
TABLET ORAL
Qty: 60 TABLET | Refills: 0 | Status: SHIPPED | OUTPATIENT
Start: 2019-11-04

## 2019-12-12 RX ORDER — TIZANIDINE 4 MG/1
TABLET ORAL
Qty: 90 TABLET | Refills: 0 | OUTPATIENT
Start: 2019-12-12

## 2019-12-12 RX ORDER — CLONAZEPAM 0.5 MG/1
TABLET ORAL
Qty: 60 TABLET | Refills: 0 | OUTPATIENT
Start: 2019-12-12

## 2019-12-23 ENCOUNTER — OFFICE VISIT (OUTPATIENT)
Dept: RHEUMATOLOGY | Facility: CLINIC | Age: 59
End: 2019-12-23
Payer: COMMERCIAL

## 2019-12-23 VITALS
DIASTOLIC BLOOD PRESSURE: 99 MMHG | BODY MASS INDEX: 31.39 KG/M2 | SYSTOLIC BLOOD PRESSURE: 162 MMHG | WEIGHT: 171.63 LBS

## 2019-12-23 DIAGNOSIS — M79.7 FIBROMYALGIA: Primary | ICD-10-CM

## 2019-12-23 DIAGNOSIS — M85.89 OSTEOPENIA OF MULTIPLE SITES: ICD-10-CM

## 2019-12-23 PROCEDURE — 99213 PR OFFICE/OUTPT VISIT, EST, LEVL III, 20-29 MIN: ICD-10-PCS | Mod: S$GLB,,, | Performed by: INTERNAL MEDICINE

## 2019-12-23 PROCEDURE — 3008F PR BODY MASS INDEX (BMI) DOCUMENTED: ICD-10-PCS | Mod: S$GLB,,, | Performed by: INTERNAL MEDICINE

## 2019-12-23 PROCEDURE — 3008F BODY MASS INDEX DOCD: CPT | Mod: S$GLB,,, | Performed by: INTERNAL MEDICINE

## 2019-12-23 PROCEDURE — 99213 OFFICE O/P EST LOW 20 MIN: CPT | Mod: S$GLB,,, | Performed by: INTERNAL MEDICINE

## 2019-12-23 RX ORDER — AMITRIPTYLINE HYDROCHLORIDE 50 MG/1
100 TABLET, FILM COATED ORAL
COMMUNITY
Start: 2019-12-19

## 2019-12-23 RX ORDER — TIZANIDINE 4 MG/1
TABLET ORAL
Qty: 90 TABLET | Refills: 3 | Status: SHIPPED | OUTPATIENT
Start: 2019-12-23 | End: 2020-05-03 | Stop reason: SDUPTHER

## 2019-12-23 NOTE — PROGRESS NOTES
"       [facility] RHEUMATOLOGY            PROGRESS NOTE      Subjective:       Patient ID:   NAME: Isaura Troy : 1960     59 y.o. female    Referring Doc: No ref. provider found  Other Physicians:    Chief Complaint:  Fibromyalgia      History of Present Illness:     Patient returns today for a regularly scheduled follow-up visit for  fibromyalgia and osteoarthritis     The patient is doing well.  Patient states she is not on Klonopin or Cymbalta any more in doing well.  Occasional muscle aches.  No chest pains cough or shortness of breath.  She was recently hospitalized for what appears to be TIA.  No paresthesias at this time.  She sees neurologist for headaches.            ROS:   GEN:  No  fever, night sweats . weight is stable   No fatigue  SKIN: no rashes, no bruising, no ulcerations, no Raynaud's  HEENT: no HA's, No visual changes, no mucosal ulcers, no sicca symptoms,  CV:   no CP, SOB, PND, BLOCK, no orthopnea, no palpitations  PULM: normal with no SOB, cough, hemoptysis, sputum or pleuritic pain  GI:  no abdominal pain, nausea, vomiting, constipation, diarrhea, melanotic stools, BRBPR, hematemesis, no dysphagia  :   no dysuria  NEURO: no paresthesias, headaches, visual disturbances, muscle weakness  MUSCULOSKELETAL:no joint swelling, prolonged AM stiffness, + occ  back pain, no muscle pain  Allergies:  Review of patient's allergies indicates:   Allergen Reactions    Cefaclor Rash and Shortness Of Breath    Erythromycin Rash and Shortness Of Breath     "SLOWS MY BREATHING."    Sulfa (sulfonamide antibiotics) Other (See Comments) and Shortness Of Breath     "ASA EXACERBATES MY ASTHMA."    Aspirin     Latex Other (See Comments)     Makes skin peel after prolonged exposure.        Medications:    Current Outpatient Medications:     albuterol 90 mcg/actuation inhaler, Inhale 2 puffs into the lungs every 6 (six) hours as needed for Wheezing. Rescue, Disp: , Rfl:     amitriptyline (ELAVIL) 50 MG " tablet, Take 100 mg by mouth., Disp: , Rfl:     clonazePAM (KLONOPIN) 0.5 MG tablet, TAKE 2 TABLETS BY MOUTH AT BEDTIME, Disp: 60 tablet, Rfl: 0    diclofenac sodium 2 % SoPk, Apply 40 mg topically 2 (two) times daily., Disp: 1 packet, Rfl: 2    meclizine (ANTIVERT) 25 mg tablet, Take 50 mg by mouth., Disp: , Rfl:     montelukast (SINGULAIR) 10 mg tablet, Take 10 mg by mouth every evening., Disp: , Rfl:     promethazine (PHENERGAN) 25 MG tablet, Take 25 mg by mouth every 4 to 6 hours as needed for Nausea., Disp: , Rfl:     DULoxetine (CYMBALTA) 30 MG capsule, TK 1 C PO BID (Patient not taking: Reported on 12/23/2019), Disp: 60 capsule, Rfl: 5    tiZANidine (ZANAFLEX) 4 MG tablet, TK 1/4 TO 1 T PO TID, Disp: 90 tablet, Rfl: 3    traMADol (ULTRAM) 50 mg tablet, 1-2 po q hs prn (Patient not taking: Reported on 12/23/2019), Disp: 180 tablet, Rfl: 3  No current facility-administered medications for this visit.     PMHx/PSHx Updates:          Objective:     Vitals:  Blood pressure (!) 162/99, weight 77.8 kg (171 lb 9.6 oz).    Physical Examination:   GEN: no apparent distress, comfortable; AAOx3  SKIN: no rashes,no ulceration, no Raynaud's, no petechiae, no SQ nodules,  HEAD: normal  EYES: no pallor, no icterus  NECK: no masses, thyroid normal, trachea midline, no LAD/LN's, supple  CV: RRR with no murmur; l S1 and S2 reg. ,no gallop no rubs,   CHEST: Normal respiratory effort; CTAB; normal breath sounds; no wheeze or crackles  MUSC/Skeletal: ROM normal; no crepitus; joints without synovitis,  no deformities  No joint swelling or tenderness of PIP, MCP, wrist, elbow, shoulder, or knee joints  EXTREM: no clubbing, cyanosis, no edema,normal  pulses   NEURO: grossly intact; motor WNL; AAOx3;   PSYCH: normal mood, affect and behavior  LYMPH: normal cervical, supraclavicular          Labs:   Lab Results   Component Value Date    WBC 8.5 08/02/2017    HGB 12.6 08/02/2017    HCT 37.0 08/02/2017    MCV 90.9 08/02/2017      08/02/2017    CMP  @LASTLAB(NA,K,CL,CO2,GLU,BUN,Creatinine,Calcium,PROT,Albumin,Bilitot,Alkphos,AST,ALT,CRP,ESR,RF,CCP,ALEXIS,SSA,CPK,uric acid) )@  I have reviewed all available lab results and radiology reports.    Radiology/Diagnostic Studies:        Assessment/Plan:   (1) 59 y.o. female with diagnosis of fibromyalgia and osteoarthritis.  She is stable  Elevated blood pressure.  She is aware of potential complications of untreated blood pressure elevation like strokes heart attacks renal failure and death.  She will make appointment and follow-up with cardiologist ;in the meantime she will monitor her blood pressure at home and will go to emergency room if systolic higher than 180 and diastolic higher than 100.                Discussion:     I have explained all of the above in detail and the patient understands all of the current recommendation(s). I have answered all questions to the best of my ability and to their complete satisfaction.       The patient is to continue with the current management plan         RTC in         Electronically signed by Coral Hernández MD

## 2020-05-03 RX ORDER — TIZANIDINE 4 MG/1
TABLET ORAL
Qty: 90 TABLET | Refills: 3 | Status: SHIPPED | OUTPATIENT
Start: 2020-05-03

## 2021-05-12 ENCOUNTER — PATIENT MESSAGE (OUTPATIENT)
Dept: RESEARCH | Facility: HOSPITAL | Age: 61
End: 2021-05-12

## 2024-07-24 PROBLEM — M15.9 GENERALIZED OSTEOARTHRITIS: Status: ACTIVE | Noted: 2024-07-24

## 2024-07-24 PROBLEM — K21.9 GASTROESOPHAGEAL REFLUX DISEASE: Status: ACTIVE | Noted: 2020-10-01

## 2024-07-24 PROBLEM — G51.0 FACIAL PALSY: Status: ACTIVE | Noted: 2021-12-13

## 2024-07-24 PROBLEM — M16.9 OSTEOARTHRITIS OF HIP: Status: ACTIVE | Noted: 2021-05-19

## 2024-07-24 PROBLEM — J30.9 ALLERGIC RHINITIS: Status: ACTIVE | Noted: 2024-07-24

## 2024-07-24 PROBLEM — F90.0 ATTENTION DEFICIT HYPERACTIVITY DISORDER, PREDOMINANTLY INATTENTIVE TYPE: Status: ACTIVE | Noted: 2023-07-13

## 2024-07-24 PROBLEM — F41.1 GENERALIZED ANXIETY DISORDER: Status: ACTIVE | Noted: 2019-05-28

## 2024-07-24 PROBLEM — G47.33 OBSTRUCTIVE SLEEP APNEA OF ADULT: Status: ACTIVE | Noted: 2021-06-22

## 2024-07-24 PROBLEM — M26.629 TEMPOROMANDIBULAR JOINT-PAIN-DYSFUNCTION SYNDROME: Status: ACTIVE | Noted: 2020-01-06

## 2024-07-24 PROBLEM — M79.7 FIBROMYALGIA: Status: ACTIVE | Noted: 2024-07-24

## 2024-07-24 PROBLEM — E28.2 POLYCYSTIC OVARY SYNDROME: Status: ACTIVE | Noted: 2024-07-24

## 2024-07-24 PROBLEM — H81.09 MENIERE'S DISEASE: Status: ACTIVE | Noted: 2023-04-20

## 2024-07-24 PROBLEM — Z86.79 HISTORY OF HYPERTENSION: Status: ACTIVE | Noted: 2024-07-24

## 2024-07-24 PROBLEM — G50.0 RIGHT TRIGEMINAL NEURALGIA: Status: ACTIVE | Noted: 2021-12-13

## 2024-07-24 PROBLEM — R91.1 SOLITARY PULMONARY NODULE: Status: ACTIVE | Noted: 2023-10-24

## 2024-07-24 PROBLEM — R00.2 PALPITATIONS: Status: ACTIVE | Noted: 2023-11-01

## 2024-07-24 PROBLEM — E11.65 TYPE 2 DIABETES MELLITUS WITH HYPERGLYCEMIA, WITHOUT LONG-TERM CURRENT USE OF INSULIN: Status: ACTIVE | Noted: 2019-12-19

## 2024-07-24 PROBLEM — G43.701 CHRONIC MIGRAINE WITHOUT AURA, WITH STATUS MIGRAINOSUS: Status: ACTIVE | Noted: 2021-06-22

## 2024-07-24 PROBLEM — J45.909 AIRWAY HYPERREACTIVITY: Status: ACTIVE | Noted: 2024-07-24

## 2024-07-24 PROBLEM — G47.00 INSOMNIA: Status: ACTIVE | Noted: 2021-11-03

## 2024-09-04 ENCOUNTER — OFFICE VISIT (OUTPATIENT)
Dept: OTOLARYNGOLOGY | Facility: CLINIC | Age: 64
End: 2024-09-04
Payer: COMMERCIAL

## 2024-09-04 ENCOUNTER — CLINICAL SUPPORT (OUTPATIENT)
Dept: AUDIOLOGY | Facility: CLINIC | Age: 64
End: 2024-09-04
Payer: COMMERCIAL

## 2024-09-04 VITALS
SYSTOLIC BLOOD PRESSURE: 146 MMHG | WEIGHT: 156.94 LBS | BODY MASS INDEX: 28.88 KG/M2 | HEIGHT: 62 IN | DIASTOLIC BLOOD PRESSURE: 94 MMHG

## 2024-09-04 DIAGNOSIS — H93.13 TINNITUS AURIUM, BILATERAL: ICD-10-CM

## 2024-09-04 DIAGNOSIS — H81.03 MENIERE'S DISEASE OF BOTH EARS: Primary | ICD-10-CM

## 2024-09-04 DIAGNOSIS — L29.9 EAR ITCH: ICD-10-CM

## 2024-09-04 DIAGNOSIS — H81.10 BENIGN PAROXYSMAL POSITIONAL VERTIGO, UNSPECIFIED LATERALITY: ICD-10-CM

## 2024-09-04 DIAGNOSIS — H90.3 BILATERAL SENSORINEURAL HEARING LOSS: ICD-10-CM

## 2024-09-04 DIAGNOSIS — H90.3 BILATERAL HIGH FREQUENCY SENSORINEURAL HEARING LOSS: Primary | ICD-10-CM

## 2024-09-04 DIAGNOSIS — R09.82 PND (POST-NASAL DRIP): ICD-10-CM

## 2024-09-04 DIAGNOSIS — H93.13 TINNITUS, BILATERAL: ICD-10-CM

## 2024-09-04 PROCEDURE — 99999 PR PBB SHADOW E&M-EST. PATIENT-LVL II: CPT | Mod: PBBFAC,,, | Performed by: AUDIOLOGIST-HEARING AID FITTER

## 2024-09-04 PROCEDURE — 1159F MED LIST DOCD IN RCRD: CPT | Mod: CPTII,S$GLB,, | Performed by: STUDENT IN AN ORGANIZED HEALTH CARE EDUCATION/TRAINING PROGRAM

## 2024-09-04 PROCEDURE — 3008F BODY MASS INDEX DOCD: CPT | Mod: CPTII,S$GLB,, | Performed by: STUDENT IN AN ORGANIZED HEALTH CARE EDUCATION/TRAINING PROGRAM

## 2024-09-04 PROCEDURE — 3044F HG A1C LEVEL LT 7.0%: CPT | Mod: CPTII,S$GLB,, | Performed by: STUDENT IN AN ORGANIZED HEALTH CARE EDUCATION/TRAINING PROGRAM

## 2024-09-04 PROCEDURE — 3077F SYST BP >= 140 MM HG: CPT | Mod: CPTII,S$GLB,, | Performed by: STUDENT IN AN ORGANIZED HEALTH CARE EDUCATION/TRAINING PROGRAM

## 2024-09-04 PROCEDURE — 99999 PR PBB SHADOW E&M-EST. PATIENT-LVL IV: CPT | Mod: PBBFAC,,, | Performed by: STUDENT IN AN ORGANIZED HEALTH CARE EDUCATION/TRAINING PROGRAM

## 2024-09-04 PROCEDURE — 92557 COMPREHENSIVE HEARING TEST: CPT | Mod: S$GLB,,, | Performed by: AUDIOLOGIST-HEARING AID FITTER

## 2024-09-04 PROCEDURE — 3080F DIAST BP >= 90 MM HG: CPT | Mod: CPTII,S$GLB,, | Performed by: STUDENT IN AN ORGANIZED HEALTH CARE EDUCATION/TRAINING PROGRAM

## 2024-09-04 PROCEDURE — 92567 TYMPANOMETRY: CPT | Mod: S$GLB,,, | Performed by: AUDIOLOGIST-HEARING AID FITTER

## 2024-09-04 PROCEDURE — 99204 OFFICE O/P NEW MOD 45 MIN: CPT | Mod: S$GLB,,, | Performed by: STUDENT IN AN ORGANIZED HEALTH CARE EDUCATION/TRAINING PROGRAM

## 2024-09-04 RX ORDER — GABAPENTIN 100 MG/1
200 CAPSULE ORAL 3 TIMES DAILY
COMMUNITY
Start: 2024-08-29

## 2024-09-04 NOTE — PROGRESS NOTES
Otolaryngology Clinic Note    Subjective:       Patient ID: Isaura Troy is a 63 y.o. female.    Chief Complaint: Dizziness      History of Present Illness: Isaura Troy is a 63 y.o. female presenting with history of Meniere's disease dx 2021. Was having balance problems, constant noise in right ear. Balance is ok right now. When weather changes, has wobble days. At night now, hears 2 tones both ears now. Right ear main issue. Worse when tired.   She takes meclizine every night and most mornings. Takes pRN as well. Only med for this right now, been on for several years. Can tell if she misses it.   Feels like right ear is full, glands feel swollen past week. Has trigeminal neuralgia both sides- started with pain right ear. Also with migraine history. Has broken fibula in boot now- worsening her balance- off and on since October. Reports auditory processing issues. Thinks right ear hearing is worse on last audiogram.   She is on carbamazepine, gabapentin, and qulipta, nurtec, toradol.   Has PND as well.   Reports dad had this as well. Lives by herself and worried about falling.   Does balance PT every 2 years or so and helps.   Has had BPPV before and when she has hit her head, needs repositioning, goes to audibel.   Also reports ears very itchy.     Past Surgical History:   Procedure Laterality Date    CERVICAL CONIZATION   W/ LASER      CHOLECYSTECTOMY      TONSILLECTOMY       Past Medical History:   Diagnosis Date    Asthma     Diabetes mellitus     Fibromyalgia     Migraines     Migraines     Hemiplegic    Spinal stenosis      Social Determinants of Health     Tobacco Use: Low Risk  (9/4/2024)    Patient History     Smoking Tobacco Use: Never     Smokeless Tobacco Use: Never     Passive Exposure: Not on file   Alcohol Use: Not At Risk (9/4/2024)    AUDIT-C     Frequency of Alcohol Consumption: Monthly or less     Average Number of Drinks: 1 or 2     Frequency of Binge Drinking: Less than monthly  "  Financial Resource Strain: High Risk (9/4/2024)    Overall Financial Resource Strain (CARDIA)     Difficulty of Paying Living Expenses: Hard   Food Insecurity: No Food Insecurity (9/4/2024)    Hunger Vital Sign     Worried About Running Out of Food in the Last Year: Never true     Ran Out of Food in the Last Year: Never true   Transportation Needs: Unknown (3/10/2021)    Received from [facility]    PRAPARE - Transportation     Lack of Transportation (Medical): Patient declined     Lack of Transportation (Non-Medical): Patient declined   Physical Activity: Inactive (9/4/2024)    Exercise Vital Sign     Days of Exercise per Week: 0 days     Minutes of Exercise per Session: 0 min   Stress: Stress Concern Present (9/4/2024)    [employer] - Occupational Stress Questionnaire     Feeling of Stress : Rather much   Housing Stability: Unknown (9/4/2024)    Housing Stability Vital Sign     Unable to Pay for Housing in the Last Year: No     Homeless in the Last Year: Not on file   Depression: Not at risk (10/7/2023)    Received from [facility]    PHQ-2     PHQ-2 Total Score: 0   Utilities: At Risk (9/4/2024)    [facility] Utilities     Threatened with loss of utilities: Yes   Health Literacy: Adequate Health Literacy (9/4/2024)     Health Literacy     Frequency of need for help with medical instructions: Rarely   Social Isolation: Not on file     Review of patient's allergies indicates:   Allergen Reactions    Cefaclor Rash and Shortness Of Breath    Erythromycin Rash and Shortness Of Breath     "SLOWS MY BREATHING."    Sulfa (sulfonamide antibiotics) Other (See Comments) and Shortness Of Breath     "ASA EXACERBATES MY ASTHMA."    Aspirin     Latex Other (See Comments)     Makes skin peel after prolonged exposure.     Venom-wasp Other (See Comments)    Tramadol Other (See Comments)     Current Outpatient Medications   Medication Instructions    albuterol 90 mcg/actuation " inhaler 2 puffs, Inhalation, Every 6 hours PRN, Rescue     atomoxetine (STRATTERA) 100 mg, Oral, Daily    azelastine (ASTELIN) 137 mcg (0.1 %) nasal spray 2 sprays, 2 times daily    carBAMazepine (TEGRETOL XR) 100 mg, Oral    diclofenac (VOLTAREN) 75 mg, Oral, 2 times daily    gabapentin (NEURONTIN) 200 mg, Oral, 3 times daily    hydroxychloroquine (PLAQUENIL) 200 mg, Oral, 2 times daily    ketorolac (TORADOL) 10 mg, Oral, Every 12 hours PRN    levocetirizine (XYZAL) 5 MG tablet 1 tablet, Oral, Nightly    LORazepam (ATIVAN) 0.5 mg, Oral, Every 6 hours PRN    milk thistle 175 mg, Oral    MOUNJARO 5 mg/0.5 mL PnIj Subcutaneous    NURTEC 75 mg, Oral, Once as needed    promethazine (PHENERGAN) 25 mg, Oral, Every 4-6 hours PRN    QULIPTA 60 mg Tab 1 tablet, Oral    tiZANidine (ZANAFLEX) 4 mg, Oral, Every 8 hours         ENT ROS negative except as stated above.     Patient answers are not available for this visit.            Objective:      Vitals:    09/04/24 0828   BP: (!) 146/94       General: NAD, well appearing  Eyes: Normal conjunctiva and lids  Face: symmetric, nerve intact  Nose: The nose is without any evidence of any deformity. The nasal mucosa is moist. The septum is midline. There is no evidence of septal hematoma. The turbinates are without abnormality.   Ears: The ears are with normal-appearing pinna. Examination of the canals is normal appearing bilaterally. There is no drainage or erythema noted. The tympanic membranes are normal appearing with pearly color, normal-appearing landmarks and normal light reflex. Hearing is grossly intact. Valsalva -  Mouth: No obvious abnormalities to the lips. The teeth are unremarkable. The gingivae are without any obvious evidence of infection or lesion. The oral mucosa is moist and pink. There are no obvious masses to the hard or soft palate.   Oropharynx: The uvula is midline.  The tongue is midline. The posterior pharynx is inflamed with thick PND. The tonsils are  absent.  Salivary glands: The salivary glands are symmetric and not enlarged, no masses  Neck: No lymphadenopathy, trachea midline, thryoid not enlarged.  Psych: Normal mood and affect.   Neuro: Grossly intact  Speech: fluent  In boot on left    Test Review: I personally reviewed audiogram         Assessment and Plan:       1. Meniere's disease of both ears    2. PND (post-nasal drip)    3. Benign paroxysmal positional vertigo, unspecified laterality    4. Tinnitus aurium, bilateral    5. Ear itch        Suspect meniere's right ear, is hard to differentiate with trigeminal neuralgia and migraine history, all a spectrum that can cause dizziness. She is stable on meclizine BID. She will continue this. She gets OTC, is cheaper.     For PND- options of saline, hypertronic saline, xylitol spray, ponaris. Could try astelin rx well.     Mineral oil for dry ear or similar.     RTC: PRN, contact with acute flares or sudden hearing change    Plan of care was discussed in detail with the patient, who agreed with the plan as above. All questions were answered in detail.     Janeth Reyes MD  Otolaryngology

## 2024-09-04 NOTE — PROGRESS NOTES
Isaura Troy was seen on 09/04/2024 for an audiological evaluation. Pt was alone during today's visit. Pertinent complaints today include hearing loss and tinnitus AU. Pt confirms history of loud noise exposure and confirms early onset of genetic family history of hearing loss.     Results reveal a bilateral normal through 4K Hz sloping to moderate HF sensorineural hearing loss.    Speech Reception Thresholds were  0 dBHL for the right ear and 0 dBHL for the left ear.    Word recognition scores were good for the right ear and good for the left ear.   Tympanograms were Type A for the right ear and Type A for the left ear.    Audiogram results were reviewed in detail with patient and all questions were answered. Results will be reviewed by the referring provider at the completion of this note. All complaints were addressed during this visit to the patient's satisfaction.  Recommend binaural amplification pending medical clearance, repeat hearing testing in one year due to tinnitus and bilateral hearing protection with either muffs or in-ear protection in loud noises. Plan of care was discussed in detail with the patient, who agreed with the plan as above. The pt will contact the insurance company to determine any benefits and call the clinic if she would like to proceed with a HA consult.       No

## 2024-09-04 NOTE — PATIENT INSTRUCTIONS
Tinnitus measures:  1.  Avoid exposure to loud sounds and noises.  Wear ear protection around loud noises.  2.  Get your blood pressure check regularly.  If it is high, see your doctor.  3.  Decrease salt intake.  4.  Avoid stimulants such as caffeine and tobacco.  5.  Exercise daily to improve circulation.  6.  Get adequate rest.  Avoid fatigue.  7.  Stop worrying about the noise.  Recognize the noise as an annoyance and learned to ignore it as much as possible.  8.  Consider a trial of lipoflavanoids, slow-release niacin, or Arches' Tinnitus Formula (over-the-counter).  9.  Purchase a white noise machine to mask tinnitus.  10. Hearing aids can help.     Meniere's diet  Low Salt Diet. No MSG. No Nutrasweet, especially diet drinks. Limit caffeine to no more than 2 cups of coffee per day. Avoid alcohol. Avoid nicotine/tobacco.

## 2024-09-12 DIAGNOSIS — M25.511 PAIN IN RIGHT SHOULDER: ICD-10-CM

## 2024-09-12 RX ORDER — DICLOFENAC SODIUM 75 MG/1
TABLET, DELAYED RELEASE ORAL
Qty: 28 TABLET | Refills: 0 | Status: SHIPPED | OUTPATIENT
Start: 2024-09-12

## 2024-10-22 ENCOUNTER — PATIENT MESSAGE (OUTPATIENT)
Dept: RESEARCH | Facility: HOSPITAL | Age: 64
End: 2024-10-22
Payer: COMMERCIAL